# Patient Record
Sex: FEMALE | Race: WHITE | Employment: PART TIME | URBAN - METROPOLITAN AREA
[De-identification: names, ages, dates, MRNs, and addresses within clinical notes are randomized per-mention and may not be internally consistent; named-entity substitution may affect disease eponyms.]

---

## 2021-06-03 ENCOUNTER — OFFICE VISIT (OUTPATIENT)
Dept: VASCULAR SURGERY | Age: 66
End: 2021-06-03
Payer: COMMERCIAL

## 2021-06-03 VITALS
BODY MASS INDEX: 20.4 KG/M2 | HEART RATE: 76 BPM | DIASTOLIC BLOOD PRESSURE: 94 MMHG | SYSTOLIC BLOOD PRESSURE: 152 MMHG | HEIGHT: 67 IN | WEIGHT: 130 LBS

## 2021-06-03 DIAGNOSIS — I83.893 SYMPTOMATIC VARICOSE VEINS OF BOTH LOWER EXTREMITIES: Primary | ICD-10-CM

## 2021-06-03 PROCEDURE — 4040F PNEUMOC VAC/ADMIN/RCVD: CPT | Performed by: SURGERY

## 2021-06-03 PROCEDURE — 1090F PRES/ABSN URINE INCON ASSESS: CPT | Performed by: SURGERY

## 2021-06-03 PROCEDURE — 3017F COLORECTAL CA SCREEN DOC REV: CPT | Performed by: SURGERY

## 2021-06-03 PROCEDURE — G8400 PT W/DXA NO RESULTS DOC: HCPCS | Performed by: SURGERY

## 2021-06-03 PROCEDURE — 1036F TOBACCO NON-USER: CPT | Performed by: SURGERY

## 2021-06-03 PROCEDURE — G8427 DOCREV CUR MEDS BY ELIG CLIN: HCPCS | Performed by: SURGERY

## 2021-06-03 PROCEDURE — 1123F ACP DISCUSS/DSCN MKR DOCD: CPT | Performed by: SURGERY

## 2021-06-03 PROCEDURE — G8420 CALC BMI NORM PARAMETERS: HCPCS | Performed by: SURGERY

## 2021-06-03 PROCEDURE — 99204 OFFICE O/P NEW MOD 45 MIN: CPT | Performed by: SURGERY

## 2021-06-03 NOTE — LETTER
[facility]  [facility]  Phone: [phone]  Fax: [phone]    Placido Connell MD    Carol 3, 2021     Colby Santos, [address] [place] [address]    Patient: Shakir Aquino   MR Number: <[id number]>   YOB: 1955   Date of Visit: 6/3/2021       Dear Ruth Frank: Thank you for referring Shakir Aquino to me for evaluation/treatment. Below are the relevant portions of my assessment and plan of care. Varicose veins B legs with leg discomfort - likely symptomatic. Begin KH 20/30 mmHg compression stockings on a daily basis. F/U with MD after B leg venous reflux scan to discuss results and treatment options. If you have questions, please do not hesitate to call me. I look forward to following Terrie along with you and I will keep you appraised of our findings and plans. A sincere thanks for asking me to see her and please let me know if I can help you with any of your other patients in the future.     Sincerely,        Placido Connell MD

## 2021-06-03 NOTE — PROGRESS NOTES
Subjective:      Patient ID: Clyde Nguyen is a 76 y.o. female. HPI Referral from Pratik Godinez MD for evaluation of complaints of B leg crampy calf pain associated with achy discomfort, fatigue and heaviness. Symptoms worse in areas of posterior calf varicosities. Worse with standing and improved with elevation with exercise. Has not worn stockings. No previous venous interventions. No H/O SVT, ulceration, rashes, bleeding or swelling. + FH    Past Medical History:   Diagnosis Date    PONV (postoperative nausea and vomiting)      Past Surgical History:   Procedure Laterality Date    APPENDECTOMY       SECTION      x 2    COLONOSCOPY  5/29/15     No Known Allergies  Current Outpatient Medications   Medication Sig Dispense Refill    Multiple Vitamin (MULTI-VITAMIN PO) Take by mouth      Calcium Citrate-Vitamin D (CITRACAL MAXIMUM PO) Take by mouth      diclofenac (VOLTAREN) 75 MG EC tablet Take 1 tablet by mouth 2 times daily (with meals) 60 tablet 3    HYDROcodone-acetaminophen (NORCO) 5-325 MG per tablet TAKE 1-2 TABLETS BY MOUTH EVERY 6 HOURS AS NEEDED FOR PAIN 60 tablet 0    Progesterone 100 MG CAPS Take 100 mg by mouth every 7 days      Estradiol (DIVIGEL) 0.25 MG/0.25GM GEL Place onto the skin every 7 days      Doxycycline Hyclate 20 MG CAPS Take by mouth      Diphenhydramine-APAP, sleep, (TYLENOL PM EXTRA STRENGTH PO) Take by mouth       No current facility-administered medications for this visit. Social History     Socioeconomic History    Marital status:      Spouse name: Not on file    Number of children: Not on file    Years of education: Not on file    Highest education level: Not on file   Occupational History    Not on file   Tobacco Use    Smoking status: Former Smoker     Quit date: 1995     Years since quittin.4    Smokeless tobacco: Never Used   Substance and Sexual Activity    Alcohol use:  Yes     Alcohol/week: 0.0 standard drinks     Types: 14 Glasses of wine per week    Drug use: No    Sexual activity: Not on file   Other Topics Concern    Not on file   Social History Narrative    ** Merged History Encounter **          Social Determinants of Health     Financial Resource Strain:     Difficulty of Paying Living Expenses:    Food Insecurity:     Worried About Running Out of Food in the Last Year:     Ran Out of Food in the Last Year:    Transportation Needs:     Lack of Transportation (Medical):  Lack of Transportation (Non-Medical):    Physical Activity:     Days of Exercise per Week:     Minutes of Exercise per Session:    Stress:     Feeling of Stress :    Social Connections:     Frequency of Communication with Friends and Family:     Frequency of Social Gatherings with Friends and Family:     Attends Sikh Services:     Active Member of Clubs or Organizations:     Attends Club or Organization Meetings:     Marital Status:    Intimate Partner Violence:     Fear of Current or Ex-Partner:     Emotionally Abused:     Physically Abused:     Sexually Abused:      Family History   Problem Relation Age of Onset    Cancer Mother         breast    Cancer Maternal Uncle          Review of Systems   All other systems reviewed and are negative. 15 py ROS confirmed personally by this MD    Objective:   Physical Exam  Vitals and nursing note reviewed. Constitutional:       Appearance: Normal appearance. HENT:      Head: Normocephalic and atraumatic. Right Ear: External ear normal.      Left Ear: External ear normal.      Nose: Nose normal.      Mouth/Throat:      Mouth: Mucous membranes are moist.   Eyes:      Extraocular Movements: Extraocular movements intact. Conjunctiva/sclera: Conjunctivae normal.   Cardiovascular:      Rate and Rhythm: Normal rate and regular rhythm. Pulses: Normal pulses. Heart sounds: Normal heart sounds.    Pulmonary:      Effort: Pulmonary effort is normal.      Breath sounds: Normal breath sounds. Abdominal:      General: Bowel sounds are normal.      Palpations: Abdomen is soft. There is no mass. Musculoskeletal:      Cervical back: Normal range of motion and neck supple. Right lower leg: No edema. Left lower leg: No edema. Skin:     General: Skin is warm and dry. Capillary Refill: Capillary refill takes less than 2 seconds. Neurological:      General: No focal deficit present. Mental Status: She is alert and oriented to person, place, and time. Cranial Nerves: No cranial nerve deficit. Sensory: No sensory deficit. Motor: No weakness. Coordination: Coordination normal.      Gait: Gait normal.   Psychiatric:         Mood and Affect: Mood normal.         Behavior: Behavior normal.         Thought Content: Thought content normal.         Judgment: Judgment normal.       R size  L size   +  Spider  telangiectasias +      Reticular veins     Ankle/post calf 5-7 mm Varicose   veins Post calf 5-7 mm       Assessment:      Varicose veins B legs with leg discomfort      Plan:      Begin KH 20/30 mmHg compression stockings on a daily basis. F/U with MD after B leg venous reflux scan to discuss results and treatment options. Pt understands and agrees to this approach.

## 2021-07-30 DIAGNOSIS — I83.893 SYMPTOMATIC VARICOSE VEINS OF BOTH LOWER EXTREMITIES: Primary | ICD-10-CM

## 2021-08-03 ENCOUNTER — PROCEDURE VISIT (OUTPATIENT)
Dept: VASCULAR SURGERY | Age: 66
End: 2021-08-03

## 2021-08-03 DIAGNOSIS — I83.893 SYMPTOMATIC VARICOSE VEINS OF BOTH LOWER EXTREMITIES: ICD-10-CM

## 2021-08-05 ENCOUNTER — OFFICE VISIT (OUTPATIENT)
Dept: VASCULAR SURGERY | Age: 66
End: 2021-08-05
Payer: COMMERCIAL

## 2021-08-05 VITALS — WEIGHT: 130 LBS | BODY MASS INDEX: 20.4 KG/M2 | HEIGHT: 67 IN

## 2021-08-05 DIAGNOSIS — I83.893 SYMPTOMATIC VARICOSE VEINS OF BOTH LOWER EXTREMITIES: Primary | ICD-10-CM

## 2021-08-05 PROCEDURE — 1123F ACP DISCUSS/DSCN MKR DOCD: CPT | Performed by: SURGERY

## 2021-08-05 PROCEDURE — 4040F PNEUMOC VAC/ADMIN/RCVD: CPT | Performed by: SURGERY

## 2021-08-05 PROCEDURE — G8427 DOCREV CUR MEDS BY ELIG CLIN: HCPCS | Performed by: SURGERY

## 2021-08-05 PROCEDURE — 1036F TOBACCO NON-USER: CPT | Performed by: SURGERY

## 2021-08-05 PROCEDURE — 99213 OFFICE O/P EST LOW 20 MIN: CPT | Performed by: SURGERY

## 2021-08-05 PROCEDURE — 3017F COLORECTAL CA SCREEN DOC REV: CPT | Performed by: SURGERY

## 2021-08-05 PROCEDURE — 1090F PRES/ABSN URINE INCON ASSESS: CPT | Performed by: SURGERY

## 2021-08-05 PROCEDURE — G8420 CALC BMI NORM PARAMETERS: HCPCS | Performed by: SURGERY

## 2021-08-05 PROCEDURE — G8400 PT W/DXA NO RESULTS DOC: HCPCS | Performed by: SURGERY

## 2021-08-05 NOTE — PROGRESS NOTES
Seen back for symptomatic varicose veins B legs. Pt has reliably been wearing the prescribed KH 20/30 mmHg stockings with minimal benefit. No reported edema, bleeding, tender cord, skin changes or ulceration. Recent venous reflux scan performed on 8/3/2021 at [facility]. EXAM:  No edema, ulceration or dermatitis. All VVs soft, nontender without erythema. VRS - L CFV and popliteal reflux; B GSV reflux and L LSV reflux    A/P: Chronic superficial venous insufficiency B legs with secondary symptomatic varicose veins   SIGNIFICANT B AXIAL REFLUX with LARGE VARICOSITIES. Surgery is recommended - B GSV RFA + L LSV RFA + B stab phlebectomies. This was discussed in terms that the patient could understand. The risks, including bleeding, clotting, bruising, swelling, neuritis, skin dimpling, infection, pigmentation, scarring, and mortality were discussed. I answered all questions pertaining to surgery and post operative expectations related to return to work and daily activity. Time spent counseling and coordination of care: 25 minutes. More than 50% of visit was spent reviewing and discussing venous duplex scan. She will continue compression stockings and schedule as recommended if desired.

## 2022-01-31 ENCOUNTER — TELEPHONE (OUTPATIENT)
Dept: VASCULAR SURGERY | Age: 67
End: 2022-01-31